# Patient Record
Sex: MALE | Race: WHITE | NOT HISPANIC OR LATINO | Employment: OTHER | URBAN - METROPOLITAN AREA
[De-identification: names, ages, dates, MRNs, and addresses within clinical notes are randomized per-mention and may not be internally consistent; named-entity substitution may affect disease eponyms.]

---

## 2024-02-05 ENCOUNTER — HOSPITAL ENCOUNTER (EMERGENCY)
Facility: HOSPITAL | Age: 74
Discharge: HOME | End: 2024-02-05
Attending: EMERGENCY MEDICINE
Payer: COMMERCIAL

## 2024-02-05 VITALS
OXYGEN SATURATION: 97 % | DIASTOLIC BLOOD PRESSURE: 75 MMHG | RESPIRATION RATE: 20 BRPM | TEMPERATURE: 99 F | HEART RATE: 90 BPM | WEIGHT: 196.88 LBS | SYSTOLIC BLOOD PRESSURE: 133 MMHG

## 2024-02-05 DIAGNOSIS — R04.0 RIGHT-SIDED EPISTAXIS: Primary | ICD-10-CM

## 2024-02-05 DIAGNOSIS — R04.0 EPISTAXIS: ICD-10-CM

## 2024-02-05 LAB
ALBUMIN SERPL BCP-MCNC: 3.9 G/DL (ref 3.5–5.2)
ALP SERPL-CCNC: 86 U/L (ref 55–135)
ALT SERPL W/O P-5'-P-CCNC: 24 U/L (ref 10–44)
ANION GAP SERPL CALC-SCNC: 9 MMOL/L (ref 8–16)
APTT PPP: 28.2 SEC (ref 21–32)
AST SERPL-CCNC: 21 U/L (ref 10–40)
BASOPHILS # BLD AUTO: 0.02 K/UL (ref 0–0.2)
BASOPHILS NFR BLD: 0.3 % (ref 0–1.9)
BILIRUB SERPL-MCNC: 1 MG/DL (ref 0.1–1)
BUN SERPL-MCNC: 24 MG/DL (ref 8–23)
CALCIUM SERPL-MCNC: 9 MG/DL (ref 8.7–10.5)
CHLORIDE SERPL-SCNC: 108 MMOL/L (ref 95–110)
CO2 SERPL-SCNC: 26 MMOL/L (ref 23–29)
CREAT SERPL-MCNC: 0.9 MG/DL (ref 0.5–1.4)
DIFFERENTIAL METHOD BLD: ABNORMAL
EOSINOPHIL # BLD AUTO: 0.1 K/UL (ref 0–0.5)
EOSINOPHIL NFR BLD: 2.3 % (ref 0–8)
ERYTHROCYTE [DISTWIDTH] IN BLOOD BY AUTOMATED COUNT: 13.5 % (ref 11.5–14.5)
EST. GFR  (NO RACE VARIABLE): >60 ML/MIN/1.73 M^2
GLUCOSE SERPL-MCNC: 128 MG/DL (ref 70–110)
HCT VFR BLD AUTO: 49.5 % (ref 40–54)
HGB BLD-MCNC: 16.5 G/DL (ref 14–18)
IMM GRANULOCYTES # BLD AUTO: 0.02 K/UL (ref 0–0.04)
IMM GRANULOCYTES NFR BLD AUTO: 0.3 % (ref 0–0.5)
INR PPP: 1 (ref 0.8–1.2)
LYMPHOCYTES # BLD AUTO: 1.5 K/UL (ref 1–4.8)
LYMPHOCYTES NFR BLD: 24.6 % (ref 18–48)
MCH RBC QN AUTO: 30.6 PG (ref 27–31)
MCHC RBC AUTO-ENTMCNC: 33.3 G/DL (ref 32–36)
MCV RBC AUTO: 92 FL (ref 82–98)
MONOCYTES # BLD AUTO: 0.4 K/UL (ref 0.3–1)
MONOCYTES NFR BLD: 6.9 % (ref 4–15)
NEUTROPHILS # BLD AUTO: 4 K/UL (ref 1.8–7.7)
NEUTROPHILS NFR BLD: 65.6 % (ref 38–73)
NRBC BLD-RTO: 0 /100 WBC
PLATELET # BLD AUTO: 145 K/UL (ref 150–450)
PMV BLD AUTO: 11 FL (ref 9.2–12.9)
POTASSIUM SERPL-SCNC: 4 MMOL/L (ref 3.5–5.1)
PROT SERPL-MCNC: 6.8 G/DL (ref 6–8.4)
PROTHROMBIN TIME: 10.8 SEC (ref 9–12.5)
RBC # BLD AUTO: 5.4 M/UL (ref 4.6–6.2)
SODIUM SERPL-SCNC: 143 MMOL/L (ref 136–145)
WBC # BLD AUTO: 6.06 K/UL (ref 3.9–12.7)

## 2024-02-05 PROCEDURE — 85025 COMPLETE CBC W/AUTO DIFF WBC: CPT | Mod: ER | Performed by: EMERGENCY MEDICINE

## 2024-02-05 PROCEDURE — 93005 ELECTROCARDIOGRAM TRACING: CPT | Mod: ER

## 2024-02-05 PROCEDURE — 85730 THROMBOPLASTIN TIME PARTIAL: CPT | Mod: ER | Performed by: EMERGENCY MEDICINE

## 2024-02-05 PROCEDURE — 85610 PROTHROMBIN TIME: CPT | Mod: ER | Performed by: EMERGENCY MEDICINE

## 2024-02-05 PROCEDURE — 93010 ELECTROCARDIOGRAM REPORT: CPT | Mod: ,,, | Performed by: INTERNAL MEDICINE

## 2024-02-05 PROCEDURE — 80053 COMPREHEN METABOLIC PANEL: CPT | Mod: ER | Performed by: EMERGENCY MEDICINE

## 2024-02-05 PROCEDURE — 25000003 PHARM REV CODE 250: Mod: ER | Performed by: EMERGENCY MEDICINE

## 2024-02-05 PROCEDURE — 99284 EMERGENCY DEPT VISIT MOD MDM: CPT | Mod: 25,ER

## 2024-02-05 RX ORDER — EMPAGLIFLOZIN 25 MG/1
1 TABLET, FILM COATED ORAL DAILY
COMMUNITY
Start: 2023-12-14

## 2024-02-05 RX ORDER — LIDOCAINE HYDROCHLORIDE AND EPINEPHRINE 10; 10 MG/ML; UG/ML
10 INJECTION, SOLUTION INFILTRATION; PERINEURAL
Status: DISCONTINUED | OUTPATIENT
Start: 2024-02-05 | End: 2024-02-05

## 2024-02-05 RX ORDER — METOPROLOL SUCCINATE 25 MG/1
25 TABLET, EXTENDED RELEASE ORAL NIGHTLY
COMMUNITY
Start: 2023-12-19

## 2024-02-05 RX ORDER — BLOOD-GLUCOSE METER
EACH MISCELLANEOUS
COMMUNITY
Start: 2023-09-19

## 2024-02-05 RX ORDER — FUROSEMIDE 40 MG/1
40 TABLET ORAL EVERY MORNING
COMMUNITY
Start: 2024-01-12

## 2024-02-05 RX ORDER — ROSUVASTATIN CALCIUM 40 MG/1
1 TABLET, COATED ORAL DAILY
COMMUNITY
Start: 2023-03-31

## 2024-02-05 RX ORDER — ASPIRIN 81 MG/1
1 TABLET ORAL EVERY MORNING
COMMUNITY

## 2024-02-05 RX ORDER — LOSARTAN POTASSIUM 50 MG/1
50 TABLET ORAL
COMMUNITY
Start: 2023-12-19

## 2024-02-05 RX ORDER — LANCETS 33 GAUGE
EACH MISCELLANEOUS
COMMUNITY
Start: 2023-09-19

## 2024-02-05 RX ORDER — LANCETS
EACH MISCELLANEOUS
COMMUNITY
Start: 2023-09-15

## 2024-02-05 RX ORDER — SACUBITRIL AND VALSARTAN 24; 26 MG/1; MG/1
1 TABLET, FILM COATED ORAL
COMMUNITY
Start: 2024-01-31

## 2024-02-05 RX ORDER — METOPROLOL SUCCINATE 50 MG/1
1 TABLET, EXTENDED RELEASE ORAL NIGHTLY
COMMUNITY
Start: 2024-01-12

## 2024-02-05 RX ORDER — IBUPROFEN 200 MG
1 CAPSULE ORAL
COMMUNITY
Start: 2023-09-15

## 2024-02-05 RX ADMIN — LIDOCAINE HYDROCHLORIDE 10 ML: 10; .005 INJECTION, SOLUTION EPIDURAL; INFILTRATION; INTRACAUDAL; PERINEURAL at 07:02

## 2024-02-05 RX ADMIN — PHENYLEPHRINE HYDROCHLORIDE 2 SPRAY: 0.5 SPRAY NASAL at 06:02

## 2024-02-06 LAB
OHS QRS DURATION: 90 MS
OHS QTC CALCULATION: 462 MS

## 2024-02-06 NOTE — ED PROVIDER NOTES
Encounter Date: 2/5/2024       History     Chief Complaint   Patient presents with    Epistaxis     Started on elequis 3 days ago for a fib. Nose bleed x 30 min.      The history is provided by the patient.   Epistaxis   This is a new problem. The current episode started just prior to arrival. The problem occurs constantly. The problem has been gradually improving. The problem is associated with anticoagulants and aspirin (pt started on eliquis 3 days ago.  recently had ablasion for irregular heart beat.  currently nsr). He has tried applying pressure for the symptoms. The treatment provided significant relief. His past medical history is significant for hypertension. Past medical history comments: a fib.     Review of patient's allergies indicates:   Allergen Reactions    Atorvastatin      Cough    Metformin      Gi upset     Past Medical History:   Diagnosis Date    CHF (congestive heart failure)     Coronary artery disease     High blood pressure     Mixed hyperlipidemia     Other cardiomyopathies     Paroxysmal atrial fibrillation      No past surgical history on file.  History reviewed. No pertinent family history.  Social History     Tobacco Use    Smoking status: Former     Types: Cigarettes    Smokeless tobacco: Never   Substance Use Topics    Alcohol use: Not Currently    Drug use: Never     Review of Systems   Constitutional:  Negative for fever.   HENT:  Positive for nosebleeds. Negative for sore throat.    Respiratory:  Negative for shortness of breath.    Cardiovascular:  Negative for chest pain.   Gastrointestinal:  Negative for nausea.   Genitourinary:  Negative for dysuria.   Musculoskeletal:  Negative for back pain.   Skin:  Negative for rash.   Neurological:  Negative for weakness.   Hematological:  Does not bruise/bleed easily.   All other systems reviewed and are negative.      Physical Exam     Initial Vitals [02/05/24 1723]   BP Pulse Resp Temp SpO2   (!) 161/88 90 18 98.8 °F (37.1 °C) 98 %       MAP       --         Physical Exam    Nursing note and vitals reviewed.  Constitutional: He appears well-developed and well-nourished. He is not diaphoretic. No distress.   HENT:   Head: Normocephalic and atraumatic.   Nose: No epistaxis (dried blood noted in right nare.  no oozing of blood.  scant blood noted in posterior OP.). Right sinus exhibits no maxillary sinus tenderness and no frontal sinus tenderness. Left sinus exhibits no maxillary sinus tenderness and no frontal sinus tenderness.   Eyes: EOM are normal. Pupils are equal, round, and reactive to light. No scleral icterus.   Neck: Neck supple. No thyromegaly present.   Normal range of motion.  Cardiovascular:  Normal rate, regular rhythm, normal heart sounds and intact distal pulses.     Exam reveals no gallop and no friction rub.       No murmur heard.  Pulmonary/Chest: Breath sounds normal. No respiratory distress. He has no decreased breath sounds. He has no wheezes. He has no rhonchi. He exhibits no tenderness.   Abdominal: Abdomen is soft. Bowel sounds are normal. He exhibits no distension. There is no abdominal tenderness. There is no rebound and no guarding.   Musculoskeletal:         General: No tenderness or edema. Normal range of motion.      Cervical back: Normal range of motion and neck supple.     Lymphadenopathy:     He has no cervical adenopathy.   Neurological: He is alert and oriented to person, place, and time. He has normal strength. No cranial nerve deficit or sensory deficit. GCS score is 15. GCS eye subscore is 4. GCS verbal subscore is 5. GCS motor subscore is 6.   No acute focal neuro deficits   Skin: Skin is warm and dry. Capillary refill takes less than 2 seconds.   Psychiatric: He has a normal mood and affect. His behavior is normal. Judgment and thought content normal.         ED Course   Procedures  Labs Reviewed   CBC W/ AUTO DIFFERENTIAL - Abnormal; Notable for the following components:       Result Value    Platelets 145  (*)     All other components within normal limits   COMPREHENSIVE METABOLIC PANEL - Abnormal; Notable for the following components:    Glucose 128 (*)     BUN 24 (*)     All other components within normal limits   PROTIME-INR   APTT          Imaging Results    None          Medications   phenylephrine HCL 0.5% nasal spray 2 spray (2 sprays Each Nostril Given 2/5/24 1844)   LIDOcaine-EPINEPHrine (PF) 1%-1:200,000 injection 10 mL (10 mLs Other Given 2/5/24 1915)     Medical Decision Making  Amount and/or Complexity of Data Reviewed  Labs: ordered. Decision-making details documented in ED Course.    Risk  OTC drugs.  Prescription drug management.  Parenteral controlled substances.  Decision regarding hospitalization.  Risk Details: OTC drugs, prescription drugs and controlled substances considered.  Due to patient's symptoms improving and pain controlled pain medications ordered appropriately.  Ddx: infection, venous nasal bleed, arterial nasal bleed, htn, anemia, adverse medication effect  Hospitalization considered.  Due to patient's symptoms improving, no airway issues, able to manage oral secretions, tolerate PO and ambulate without assistance, patient will be able to be discharged with close f/u c pcp/ent within one week and to return to ER if any issues arise.                  ED Course as of 02/05/24 2109 Mon Feb 05, 2024 1906 Rhythm strip reviewed.  Nsr, no stemi. 89bpm [LV]      ED Course User Index  [LV] Isaiah Cabral Jr., MD          Vitals:    02/05/24 1723 02/05/24 1841 02/05/24 1933 02/05/24 2003   BP: (!) 161/88  (!) 145/101 133/75   Pulse: 90 94 89 90   Resp: 18  20 20   Temp: 98.8 °F (37.1 °C)      TempSrc: Oral      SpO2: 98%  96% 97%   Weight: 89.3 kg (196 lb 13.9 oz)          Results for orders placed or performed during the hospital encounter of 02/05/24   CBC auto differential   Result Value Ref Range    WBC 6.06 3.90 - 12.70 K/uL    RBC 5.40 4.60 - 6.20 M/uL    Hemoglobin 16.5 14.0 - 18.0  g/dL    Hematocrit 49.5 40.0 - 54.0 %    MCV 92 82 - 98 fL    MCH 30.6 27.0 - 31.0 pg    MCHC 33.3 32.0 - 36.0 g/dL    RDW 13.5 11.5 - 14.5 %    Platelets 145 (L) 150 - 450 K/uL    MPV 11.0 9.2 - 12.9 fL    Immature Granulocytes 0.3 0.0 - 0.5 %    Gran # (ANC) 4.0 1.8 - 7.7 K/uL    Immature Grans (Abs) 0.02 0.00 - 0.04 K/uL    Lymph # 1.5 1.0 - 4.8 K/uL    Mono # 0.4 0.3 - 1.0 K/uL    Eos # 0.1 0.0 - 0.5 K/uL    Baso # 0.02 0.00 - 0.20 K/uL    nRBC 0 0 /100 WBC    Gran % 65.6 38.0 - 73.0 %    Lymph % 24.6 18.0 - 48.0 %    Mono % 6.9 4.0 - 15.0 %    Eosinophil % 2.3 0.0 - 8.0 %    Basophil % 0.3 0.0 - 1.9 %    Differential Method Automated    Comprehensive metabolic panel   Result Value Ref Range    Sodium 143 136 - 145 mmol/L    Potassium 4.0 3.5 - 5.1 mmol/L    Chloride 108 95 - 110 mmol/L    CO2 26 23 - 29 mmol/L    Glucose 128 (H) 70 - 110 mg/dL    BUN 24 (H) 8 - 23 mg/dL    Creatinine 0.9 0.5 - 1.4 mg/dL    Calcium 9.0 8.7 - 10.5 mg/dL    Total Protein 6.8 6.0 - 8.4 g/dL    Albumin 3.9 3.5 - 5.2 g/dL    Total Bilirubin 1.0 0.1 - 1.0 mg/dL    Alkaline Phosphatase 86 55 - 135 U/L    AST 21 10 - 40 U/L    ALT 24 10 - 44 U/L    eGFR >60.0 >60 mL/min/1.73 m^2    Anion Gap 9 8 - 16 mmol/L   Protime-INR   Result Value Ref Range    Prothrombin Time 10.8 9.0 - 12.5 sec    INR 1.0 0.8 - 1.2   APTT   Result Value Ref Range    aPTT 28.2 21.0 - 32.0 sec         Imaging Results    None         Medications   phenylephrine HCL 0.5% nasal spray 2 spray (2 sprays Each Nostril Given 2/5/24 1844)   LIDOcaine-EPINEPHrine (PF) 1%-1:200,000 injection 10 mL (10 mLs Other Given 2/5/24 1915)     9:04 PM - Re-evaluation: The patient is resting comfortably and is in no acute distress.  No bleeding while here in ER after afrin/epi/lidocaine IN spray. Airway intact. No anemia. VS stabe and wnl.  He states that his symptoms have improved after treatment within ER. Discussed test results, shared treatment plan, specific conditions for return,  and importance of follow up with patient and family.  Advised close f/u c pcp/ent within one week and to return if any issues arise.   He understands and agrees with the plan as discussed. Answered  his questions at this time. He has remained hemodynamically stable throughout the ED course and is appropriate for discharge home.     Regarding EPISTAXIS, for treatment, I advised the patient to: sit up and lean forward to prevent the swallowing of blood; spit blood and saliva into a bowl; apply pressure to nose using 2 fingers to pinch nose shut for 10 minutes; breathe through your mouth; apply ice (use a cold pack or put crushed ice in a bag, cover with a towel, and place on the bridge of nose); and consider nasal packing by using a cotton ball, tissue, or gauze bandage inserted into nostril to stop the bleeding. To prevent epistaxis: instructed patient to avoid nose picking and blowing nose too hard; avoid irritants such as tobacco smoke and chemical sprays such as  that contain ammonia; use a cool mist humidifier to add moisture to the air and help keep nose moist; and apply a small amount of petroleum jelly inside nostrils to help keep nose from drying out or getting irritated. Instructed patient to follow up with primary healthcare provider or otolaryngologist as soon as possible for further evaluation and treatment and to return to the ER if they experience difficulty breathing or talking, weakness, or for any complications or concerns.     Pre-hypertension/Hypertension: The pt has been informed that they may have pre-hypertension or hypertension based on a blood pressure reading in the ED. I recommend that the pt call the PCP listed on their discharge instructions or a physician of their choice this week to arrange f/u for further evaluation of possible pre-hypertension or hypertension.     Vito Durán Jr. was given a handout which discussed their disease process, precautions, and instructions for  follow-up and therapy.     Follow-up Information       Kevin Solis MD. Schedule an appointment as soon as possible for a visit in 1 week.    Specialty: Internal Medicine  Contact information:  [address]  [address]  [facility] [address]  [phone]               [facility] - ENT Surgery. Schedule an appointment as soon as possible for a visit in 1 week.    Specialty: Otolaryngology  Contact information:  [address] [facility] [address] [phone]             [facility] - Emergency Dept.    Specialty: Emergency Medicine  Why: As needed, If symptoms worsen  Contact information:  [address]  [facility] [address] [phone]                              Medication List        ASK your doctor about these medications      apixaban 5 mg Tab  Commonly known as: ELIQUIS     aspirin 81 MG EC tablet  Commonly known as: ECOTRIN     BLOOD GLUCOSE TEST Strp  Generic drug: blood sugar diagnostic     ENTRESTO 24-26 mg per tablet  Generic drug: sacubitriL-valsartan     furosemide 40 MG tablet  Commonly known as: LASIX     JARDIANCE 25 mg tablet  Generic drug: empagliflozin     * lancets Misc     * TRUEPLUS LANCETS 33 gauge Misc  Generic drug: lancets     losartan 50 MG tablet  Commonly known as: COZAAR     * metoprolol succinate 25 MG 24 hr tablet  Commonly known as: TOPROL-XL     * metoprolol succinate 50 MG 24 hr tablet  Commonly known as: TOPROL-XL     rosuvastatin 40 MG Tab  Commonly known as: CRESTOR     TRUE METRIX GLUCOSE METER Misc  Generic drug: blood-glucose meter           * This list has 4 medication(s) that are the same as other medications prescribed for you. Read the directions carefully, and ask your doctor or other care provider to review them with you.                 Current Discharge Medication List            ED Diagnosis  1. Right-sided epistaxis    2. Epistaxis                         Clinical Impression:  Final diagnoses:  [R04.0] Epistaxis  [R04.0]  Right-sided epistaxis (Primary)          ED Disposition Condition    Discharge Stable          ED Prescriptions    None       Follow-up Information       Follow up With Specialties Details Why Contact Info    Kevin Solis MD Internal Medicine Schedule an appointment as soon as possible for a visit in 1 week  [address] [facility] [address] [facility]  [address]  [facility] [address]  [phone]      [facility] ENT Surgery Otolaryngology Schedule an appointment as soon as possible for a visit in 1 week  [address] [address] [facility] [id number] [phone]    [place] - Emergency Dept Emergency Medicine  As needed, If symptoms worsen [address] [address] [id number] [phone]             Isaiah Cabral Jr., MD  02/05/24 2110